# Patient Record
Sex: MALE | Race: BLACK OR AFRICAN AMERICAN | NOT HISPANIC OR LATINO
[De-identification: names, ages, dates, MRNs, and addresses within clinical notes are randomized per-mention and may not be internally consistent; named-entity substitution may affect disease eponyms.]

---

## 2017-05-01 ENCOUNTER — APPOINTMENT (OUTPATIENT)
Dept: OPHTHALMOLOGY | Facility: CLINIC | Age: 57
End: 2017-05-01

## 2017-08-28 ENCOUNTER — APPOINTMENT (OUTPATIENT)
Dept: OPHTHALMOLOGY | Facility: CLINIC | Age: 57
End: 2017-08-28
Payer: COMMERCIAL

## 2017-08-28 DIAGNOSIS — H52.4 MYOPIA, BILATERAL: ICD-10-CM

## 2017-08-28 DIAGNOSIS — H52.203 MYOPIA, BILATERAL: ICD-10-CM

## 2017-08-28 DIAGNOSIS — H52.13 MYOPIA, BILATERAL: ICD-10-CM

## 2017-08-28 PROCEDURE — 92331: CPT | Mod: NC

## 2018-06-26 ENCOUNTER — APPOINTMENT (OUTPATIENT)
Dept: INTERNAL MEDICINE | Facility: CLINIC | Age: 58
End: 2018-06-26
Payer: COMMERCIAL

## 2018-06-26 ENCOUNTER — APPOINTMENT (OUTPATIENT)
Dept: OTOLARYNGOLOGY | Facility: CLINIC | Age: 58
End: 2018-06-26
Payer: COMMERCIAL

## 2018-06-26 ENCOUNTER — NON-APPOINTMENT (OUTPATIENT)
Age: 58
End: 2018-06-26

## 2018-06-26 ENCOUNTER — LABORATORY RESULT (OUTPATIENT)
Age: 58
End: 2018-06-26

## 2018-06-26 VITALS — OXYGEN SATURATION: 96 % | SYSTOLIC BLOOD PRESSURE: 115 MMHG | HEART RATE: 81 BPM | DIASTOLIC BLOOD PRESSURE: 81 MMHG

## 2018-06-26 VITALS
OXYGEN SATURATION: 97 % | SYSTOLIC BLOOD PRESSURE: 132 MMHG | HEART RATE: 86 BPM | WEIGHT: 311 LBS | TEMPERATURE: 98.5 F | DIASTOLIC BLOOD PRESSURE: 88 MMHG | BODY MASS INDEX: 37.87 KG/M2 | HEIGHT: 76 IN

## 2018-06-26 DIAGNOSIS — R06.83 SNORING: ICD-10-CM

## 2018-06-26 DIAGNOSIS — H90.3 SENSORINEURAL HEARING LOSS, BILATERAL: ICD-10-CM

## 2018-06-26 DIAGNOSIS — Z82.49 FAMILY HISTORY OF ISCHEMIC HEART DISEASE AND OTHER DISEASES OF THE CIRCULATORY SYSTEM: ICD-10-CM

## 2018-06-26 DIAGNOSIS — R94.120 ABNORMAL AUDITORY FUNCTION STUDY: ICD-10-CM

## 2018-06-26 DIAGNOSIS — Z98.890 OTHER SPECIFIED POSTPROCEDURAL STATES: ICD-10-CM

## 2018-06-26 DIAGNOSIS — R20.0 ANESTHESIA OF SKIN: ICD-10-CM

## 2018-06-26 PROCEDURE — 99386 PREV VISIT NEW AGE 40-64: CPT | Mod: 25

## 2018-06-26 PROCEDURE — 36415 COLL VENOUS BLD VENIPUNCTURE: CPT

## 2018-06-26 PROCEDURE — 92551 PURE TONE HEARING TEST AIR: CPT

## 2018-06-26 PROCEDURE — 92550 TYMPANOMETRY & REFLEX THRESH: CPT

## 2018-06-26 PROCEDURE — 92557 COMPREHENSIVE HEARING TEST: CPT

## 2018-06-26 PROCEDURE — 99204 OFFICE O/P NEW MOD 45 MIN: CPT

## 2018-06-26 PROCEDURE — 93000 ELECTROCARDIOGRAM COMPLETE: CPT

## 2018-06-26 PROCEDURE — G0444 DEPRESSION SCREEN ANNUAL: CPT | Mod: 24

## 2018-06-26 NOTE — HISTORY OF PRESENT ILLNESS
[FreeTextEntry1] : Annual exam [de-identified] : Bilateral 2nd and 3rd digit cramping\par - started after wife was fooling around and twisted his fingers \par - wakes up with fingers cramped and distorted\par - uses stress ball to constantly move fingers and prevent cramping\par \par HTN \par - compliant w/ medications\par - BP slightly elevated today\par - previously on norvasc \par \par DM2\par - compliant w/ medication \par - no hypoglycemic sx\par - unsure of previous A1c\par \par Folliculitis\par - chronic issue, back of scalp 2/2 ingrown hairs\par \par obese\par - has gained weight over the year\par - tries to exercise, enjoys walking, however after 3 miles will develop ankle pain

## 2018-06-26 NOTE — PHYSICAL EXAM
[No Acute Distress] : no acute distress [Well Nourished] : well nourished [Well Developed] : well developed [Well-Appearing] : well-appearing [Normal Sclera/Conjunctiva] : normal sclera/conjunctiva [EOMI] : extraocular movements intact [Normal Outer Ear/Nose] : the outer ears and nose were normal in appearance [Supple] : supple [No Lymphadenopathy] : no lymphadenopathy [Thyroid Normal, No Nodules] : the thyroid was normal and there were no nodules present [No Respiratory Distress] : no respiratory distress  [Clear to Auscultation] : lungs were clear to auscultation bilaterally [No Accessory Muscle Use] : no accessory muscle use [Normal Rate] : normal rate  [Regular Rhythm] : with a regular rhythm [Normal S1, S2] : normal S1 and S2 [No Murmur] : no murmur heard [No Varicosities] : no varicosities [No Edema] : there was no peripheral edema [No Extremity Clubbing/Cyanosis] : no extremity clubbing/cyanosis [Soft] : abdomen soft [Non Tender] : non-tender [Non-distended] : non-distended [No Masses] : no abdominal mass palpated [No HSM] : no HSM [Normal Bowel Sounds] : normal bowel sounds [Normal Supraclavicular Nodes] : no supraclavicular lymphadenopathy [Normal Anterior Cervical Nodes] : no anterior cervical lymphadenopathy [No Joint Swelling] : no joint swelling [Grossly Normal Strength/Tone] : grossly normal strength/tone [No Rash] : no rash [Normal Gait] : normal gait [Coordination Grossly Intact] : coordination grossly intact [No Focal Deficits] : no focal deficits [Normal Affect] : the affect was normal [Alert and Oriented x3] : oriented to person, place, and time [Normal Mood] : the mood was normal [Normal Insight/Judgement] : insight and judgment were intact [de-identified] : pustules over occiput

## 2018-06-26 NOTE — HEALTH RISK ASSESSMENT
[Excellent] : ~his/her~  mood as  excellent [No falls in past year] : Patient reported no falls in the past year [0] : 2) Feeling down, depressed, or hopeless: Not at all (0) [Patient reported colonoscopy was normal] : Patient reported colonoscopy was normal [HIV Test offered] : HIV Test offered [Hepatitis C test offered] : Hepatitis C test offered [None] : None [With Significant Other] : lives with significant other [Employed] : employed [College] : College [] :  [Sexually Active] : sexually active [] : No [[id number]] : 0 [Change in mental status noted] : No change in mental status noted [Language] : denies difficulty with language [Behavior] : denies difficulty with behavior [Learning/Retaining New Information] : denies difficulty learning/retaining new information [Handling Complex Tasks] : denies difficulty handling complex tasks [Reasoning] : denies difficulty with reasoning [Spatial Ability and Orientation] : denies difficulty with spatial ability and orientation [High Risk Behavior] : no high risk behavior [Reports changes in hearing] : Reports no changes in hearing [Reports changes in vision] : Reports no changes in vision [Reports changes in dental health] : Reports no changes in dental health [ColonoscopyDate] : 2013

## 2018-06-29 LAB
25(OH)D3 SERPL-MCNC: 10 NG/ML
ALBUMIN SERPL ELPH-MCNC: 4.3 G/DL
ALP BLD-CCNC: 115 U/L
ALT SERPL-CCNC: 26 U/L
ANION GAP SERPL CALC-SCNC: 17 MMOL/L
APPEARANCE: CLEAR
AST SERPL-CCNC: 17 U/L
BASOPHILS # BLD AUTO: 0.05 K/UL
BASOPHILS NFR BLD AUTO: 0.5 %
BILIRUB SERPL-MCNC: 0.6 MG/DL
BILIRUBIN URINE: NEGATIVE
BLOOD URINE: NEGATIVE
BUN SERPL-MCNC: 15 MG/DL
C TRACH RRNA SPEC QL NAA+PROBE: NOT DETECTED
CALCIUM SERPL-MCNC: 9.6 MG/DL
CHLORIDE SERPL-SCNC: 107 MMOL/L
CHOLEST SERPL-MCNC: 165 MG/DL
CHOLEST/HDLC SERPL: 3.2 RATIO
CO2 SERPL-SCNC: 23 MMOL/L
COLOR: YELLOW
CREAT SERPL-MCNC: 1.21 MG/DL
CREAT SPEC-SCNC: 205 MG/DL
EOSINOPHIL # BLD AUTO: 0.17 K/UL
EOSINOPHIL NFR BLD AUTO: 1.7 %
GLUCOSE QUALITATIVE U: 1000 MG/DL
GLUCOSE SERPL-MCNC: 261 MG/DL
HBA1C MFR BLD HPLC: 8.9 %
HCT VFR BLD CALC: 41.1 %
HCV AB SER QL: NONREACTIVE
HCV S/CO RATIO: 0.26 S/CO
HDLC SERPL-MCNC: 51 MG/DL
HGB BLD-MCNC: 13.4 G/DL
HIV1+2 AB SPEC QL IA.RAPID: NONREACTIVE
IMM GRANULOCYTES NFR BLD AUTO: 0.1 %
KETONES URINE: NEGATIVE
LDLC SERPL CALC-MCNC: 80 MG/DL
LEUKOCYTE ESTERASE URINE: NEGATIVE
LYMPHOCYTES # BLD AUTO: 2.41 K/UL
LYMPHOCYTES NFR BLD AUTO: 23.8 %
MAN DIFF?: NORMAL
MCHC RBC-ENTMCNC: 30.7 PG
MCHC RBC-ENTMCNC: 32.6 GM/DL
MCV RBC AUTO: 94.1 FL
MICROALBUMIN 24H UR DL<=1MG/L-MCNC: 60 MG/DL
MICROALBUMIN/CREAT 24H UR-RTO: 293 MG/G
MONOCYTES # BLD AUTO: 0.27 K/UL
MONOCYTES NFR BLD AUTO: 2.7 %
N GONORRHOEA RRNA SPEC QL NAA+PROBE: NOT DETECTED
NEUTROPHILS # BLD AUTO: 7.21 K/UL
NEUTROPHILS NFR BLD AUTO: 71.2 %
NITRITE URINE: NEGATIVE
PH URINE: 5
PLATELET # BLD AUTO: 215 K/UL
POTASSIUM SERPL-SCNC: 4.9 MMOL/L
PROT SERPL-MCNC: 7.2 G/DL
PROTEIN URINE: 100 MG/DL
PSA SERPL-MCNC: 1.14 NG/ML
RBC # BLD: 4.37 M/UL
RBC # FLD: 12.5 %
SODIUM SERPL-SCNC: 147 MMOL/L
SOURCE AMPLIFICATION: NORMAL
SPECIFIC GRAVITY URINE: 1.03
T PALLIDUM AB SER QL IA: NEGATIVE
T4 FREE SERPL-MCNC: 1.2 NG/DL
TRIGL SERPL-MCNC: 171 MG/DL
TSH SERPL-ACNC: 1.14 UIU/ML
UROBILINOGEN URINE: NEGATIVE MG/DL
WBC # FLD AUTO: 10.12 K/UL

## 2018-10-26 ENCOUNTER — MEDICATION RENEWAL (OUTPATIENT)
Age: 58
End: 2018-10-26

## 2018-11-01 ENCOUNTER — MEDICATION RENEWAL (OUTPATIENT)
Age: 58
End: 2018-11-01

## 2018-11-26 ENCOUNTER — MEDICATION RENEWAL (OUTPATIENT)
Age: 58
End: 2018-11-26

## 2019-01-31 ENCOUNTER — MEDICATION RENEWAL (OUTPATIENT)
Age: 59
End: 2019-01-31

## 2019-02-27 ENCOUNTER — NON-APPOINTMENT (OUTPATIENT)
Age: 59
End: 2019-02-27

## 2019-02-27 ENCOUNTER — LABORATORY RESULT (OUTPATIENT)
Age: 59
End: 2019-02-27

## 2019-02-27 ENCOUNTER — APPOINTMENT (OUTPATIENT)
Dept: INTERNAL MEDICINE | Facility: CLINIC | Age: 59
End: 2019-02-27
Payer: COMMERCIAL

## 2019-02-27 VITALS
HEART RATE: 112 BPM | BODY MASS INDEX: 37.51 KG/M2 | HEIGHT: 76 IN | DIASTOLIC BLOOD PRESSURE: 91 MMHG | TEMPERATURE: 99.1 F | SYSTOLIC BLOOD PRESSURE: 150 MMHG | OXYGEN SATURATION: 98 % | WEIGHT: 308 LBS

## 2019-02-27 VITALS — DIASTOLIC BLOOD PRESSURE: 100 MMHG | SYSTOLIC BLOOD PRESSURE: 159 MMHG

## 2019-02-27 DIAGNOSIS — Z23 ENCOUNTER FOR IMMUNIZATION: ICD-10-CM

## 2019-02-27 DIAGNOSIS — L91.8 OTHER HYPERTROPHIC DISORDERS OF THE SKIN: ICD-10-CM

## 2019-02-27 PROCEDURE — 99214 OFFICE O/P EST MOD 30 MIN: CPT | Mod: 25

## 2019-02-27 PROCEDURE — 36415 COLL VENOUS BLD VENIPUNCTURE: CPT

## 2019-02-27 PROCEDURE — 90686 IIV4 VACC NO PRSV 0.5 ML IM: CPT

## 2019-02-27 PROCEDURE — 90670 PCV13 VACCINE IM: CPT

## 2019-02-27 PROCEDURE — 90472 IMMUNIZATION ADMIN EACH ADD: CPT

## 2019-02-27 PROCEDURE — 93000 ELECTROCARDIOGRAM COMPLETE: CPT

## 2019-02-27 PROCEDURE — 90471 IMMUNIZATION ADMIN: CPT

## 2019-02-27 RX ORDER — CLINDAMYCIN PHOSPHATE 10 MG/ML
1 LOTION TOPICAL TWICE DAILY
Qty: 1 | Refills: 3 | Status: ACTIVE | COMMUNITY
Start: 2018-06-26 | End: 1900-01-01

## 2019-02-27 NOTE — COUNSELING
[Healthy eating counseling provided] : healthy eating [Low Fat Diet] : Low fat diet [Decrease Portions] : Decrease food portions [Keep Food Diary] : Keep food diary [None] : None [Good understanding] : Patient has a good understanding of lifestyle changes and the steps needed to achieve self management goals

## 2019-02-27 NOTE — PHYSICAL EXAM
[No Acute Distress] : no acute distress [Well Nourished] : well nourished [Well Developed] : well developed [Well-Appearing] : well-appearing [Normal Sclera/Conjunctiva] : normal sclera/conjunctiva [EOMI] : extraocular movements intact [No Respiratory Distress] : no respiratory distress  [Clear to Auscultation] : lungs were clear to auscultation bilaterally [Normal S1, S2] : normal S1 and S2 [No Edema] : there was no peripheral edema [Normal Gait] : normal gait [Normal Affect] : the affect was normal [Alert and Oriented x3] : oriented to person, place, and time [Normal Mood] : the mood was normal [Normal Insight/Judgement] : insight and judgment were intact [de-identified] : tachycardic [de-identified] : scabbed over follicules back of scalp, numerous acrochordons around base of neck

## 2019-02-27 NOTE — HISTORY OF PRESENT ILLNESS
[FreeTextEntry1] : Dm2 f/u [de-identified] : DM2\par - uncontrolled\par - after last visit did not increased to 1000mg BID only daily\par - states BID made him feel dizzy, although the same thing happened when he initiated 500mg but then went away.\par \par Occipital folliculitis\par - improved w/ topical clinda solution\par \par VIt d\par - completed 1 month of tx\par - did not  refills\par \par HTN\par - BP elevated today\par - compliant w/ lisinopril 10mg\par - states is currently stressed w/ work and took the train in today which he normally does not do. \par - scheduled for stress echo w/ cardio\par \par HCM\par - needs pna and flu vaccines

## 2019-03-01 ENCOUNTER — MEDICATION RENEWAL (OUTPATIENT)
Age: 59
End: 2019-03-01

## 2019-03-01 LAB
25(OH)D3 SERPL-MCNC: 10.5 NG/ML
ALBUMIN SERPL ELPH-MCNC: 4.5 G/DL
ALP BLD-CCNC: 102 U/L
ALT SERPL-CCNC: 23 U/L
ANION GAP SERPL CALC-SCNC: 14 MMOL/L
APPEARANCE: CLEAR
AST SERPL-CCNC: 15 U/L
BASOPHILS # BLD AUTO: 0.06 K/UL
BASOPHILS NFR BLD AUTO: 0.6 %
BILIRUB SERPL-MCNC: 0.6 MG/DL
BILIRUBIN URINE: NEGATIVE
BLOOD URINE: NEGATIVE
BUN SERPL-MCNC: 20 MG/DL
CALCIUM SERPL-MCNC: 9.6 MG/DL
CHLORIDE SERPL-SCNC: 104 MMOL/L
CHOLEST SERPL-MCNC: 137 MG/DL
CHOLEST/HDLC SERPL: 3.5 RATIO
CO2 SERPL-SCNC: 23 MMOL/L
COLOR: YELLOW
CREAT SERPL-MCNC: 1.31 MG/DL
CREAT SPEC-SCNC: 238 MG/DL
EOSINOPHIL # BLD AUTO: 0.21 K/UL
EOSINOPHIL NFR BLD AUTO: 2.2 %
GLUCOSE QUALITATIVE U: ABNORMAL
GLUCOSE SERPL-MCNC: 268 MG/DL
HBA1C MFR BLD HPLC: 8.5 %
HCT VFR BLD CALC: 45 %
HDLC SERPL-MCNC: 39 MG/DL
HGB BLD-MCNC: 13.7 G/DL
IMM GRANULOCYTES NFR BLD AUTO: 0.3 %
KETONES URINE: NEGATIVE
LDLC SERPL CALC-MCNC: 71 MG/DL
LEUKOCYTE ESTERASE URINE: NEGATIVE
LYMPHOCYTES # BLD AUTO: 1.98 K/UL
LYMPHOCYTES NFR BLD AUTO: 20.8 %
MAN DIFF?: NORMAL
MCHC RBC-ENTMCNC: 30.4 GM/DL
MCHC RBC-ENTMCNC: 31.1 PG
MCV RBC AUTO: 102.3 FL
MICROALBUMIN 24H UR DL<=1MG/L-MCNC: 33.2 MG/DL
MICROALBUMIN/CREAT 24H UR-RTO: 139 MG/G
MONOCYTES # BLD AUTO: 0.44 K/UL
MONOCYTES NFR BLD AUTO: 4.6 %
NEUTROPHILS # BLD AUTO: 6.79 K/UL
NEUTROPHILS NFR BLD AUTO: 71.5 %
NITRITE URINE: NEGATIVE
PH URINE: 5.5
PLATELET # BLD AUTO: 213 K/UL
POTASSIUM SERPL-SCNC: 4.7 MMOL/L
PROT SERPL-MCNC: 7.1 G/DL
PROTEIN URINE: ABNORMAL
RBC # BLD: 4.4 M/UL
RBC # FLD: 11.5 %
SODIUM SERPL-SCNC: 141 MMOL/L
SPECIFIC GRAVITY URINE: 1.03
TRIGL SERPL-MCNC: 137 MG/DL
UROBILINOGEN URINE: NORMAL
WBC # FLD AUTO: 9.51 K/UL

## 2019-04-22 ENCOUNTER — APPOINTMENT (OUTPATIENT)
Dept: INTERNAL MEDICINE | Facility: CLINIC | Age: 59
End: 2019-04-22

## 2019-08-12 ENCOUNTER — LABORATORY RESULT (OUTPATIENT)
Age: 59
End: 2019-08-12

## 2019-08-12 ENCOUNTER — NON-APPOINTMENT (OUTPATIENT)
Age: 59
End: 2019-08-12

## 2019-08-12 ENCOUNTER — APPOINTMENT (OUTPATIENT)
Dept: INTERNAL MEDICINE | Facility: CLINIC | Age: 59
End: 2019-08-12
Payer: COMMERCIAL

## 2019-08-12 VITALS
HEART RATE: 90 BPM | TEMPERATURE: 98.7 F | SYSTOLIC BLOOD PRESSURE: 151 MMHG | HEIGHT: 76 IN | OXYGEN SATURATION: 98 % | WEIGHT: 314 LBS | BODY MASS INDEX: 38.24 KG/M2 | DIASTOLIC BLOOD PRESSURE: 105 MMHG

## 2019-08-12 DIAGNOSIS — Z87.2 PERSONAL HISTORY OF DISEASES OF THE SKIN AND SUBCUTANEOUS TISSUE: ICD-10-CM

## 2019-08-12 DIAGNOSIS — Z87.39 PERSONAL HISTORY OF OTHER DISEASES OF THE MUSCULOSKELETAL SYSTEM AND CONNECTIVE TISSUE: ICD-10-CM

## 2019-08-12 PROCEDURE — 36415 COLL VENOUS BLD VENIPUNCTURE: CPT

## 2019-08-12 PROCEDURE — 99396 PREV VISIT EST AGE 40-64: CPT | Mod: 25

## 2019-08-12 PROCEDURE — 93000 ELECTROCARDIOGRAM COMPLETE: CPT

## 2019-08-12 NOTE — HISTORY OF PRESENT ILLNESS
[FreeTextEntry1] : annual exam [de-identified] : DM2\par - takens [id number] daily instead of BID\par - tries to monitor diet\par - met w/ dietician learned which foods to avoid\par - will increase exercise\par \par Obesity\par - has gained weight since last visit\par \par HTN\par - uncontrolled\par - compliant w/ lisinopril and low salt diet\par - no CP/SOB\par - never went for stress test.\par \par HCM\par - needs pna23 next winter

## 2019-08-12 NOTE — HEALTH RISK ASSESSMENT
[Very Good] : ~his/her~  mood as very good [] : No [Yes] : Yes [No falls in past year] : Patient reported no falls in the past year [[id number]] : 0 [0] : 2) Feeling down, depressed, or hopeless: Not at all (0) [Change in mental status noted] : No change in mental status noted [Language] : denies difficulty with language [Behavior] : denies difficulty with behavior [Learning/Retaining New Information] : denies difficulty learning/retaining new information [Handling Complex Tasks] : denies difficulty handling complex tasks [Reasoning] : denies difficulty with reasoning [Spatial Ability and Orientation] : denies difficulty with spatial ability and orientation [None] : None [With Significant Other] : lives with significant other [Employed] : employed [] :  [Sexually Active] : sexually active [Fully functional (bathing, dressing, toileting, transferring, walking, feeding)] : Fully functional (bathing, dressing, toileting, transferring, walking, feeding) [Fully functional (using the telephone, shopping, preparing meals, housekeeping, doing laundry, using] : Fully functional and needs no help or supervision to perform IADLs (using the telephone, shopping, preparing meals, housekeeping, doing laundry, using transportation, managing medications and managing finances) [With Patient/Caregiver] : With Patient/Caregiver [AdvancecareDate] : 08/12/2019

## 2019-08-12 NOTE — PHYSICAL EXAM
[No Acute Distress] : no acute distress [Well Nourished] : well nourished [Well Developed] : well developed [Well-Appearing] : well-appearing [Normal Sclera/Conjunctiva] : normal sclera/conjunctiva [EOMI] : extraocular movements intact [Normal Outer Ear/Nose] : the outer ears and nose were normal in appearance [No Lymphadenopathy] : no lymphadenopathy [Supple] : supple [Thyroid Normal, No Nodules] : the thyroid was normal and there were no nodules present [No Respiratory Distress] : no respiratory distress  [No Accessory Muscle Use] : no accessory muscle use [Clear to Auscultation] : lungs were clear to auscultation bilaterally [Normal Rate] : normal rate  [Normal S1, S2] : normal S1 and S2 [Regular Rhythm] : with a regular rhythm [No Murmur] : no murmur heard [No Varicosities] : no varicosities [No Edema] : there was no peripheral edema [No Palpable Aorta] : no palpable aorta [Soft] : abdomen soft [Non Tender] : non-tender [Non-distended] : non-distended [No Masses] : no abdominal mass palpated [No HSM] : no HSM [Normal Supraclavicular Nodes] : no supraclavicular lymphadenopathy [Normal Anterior Cervical Nodes] : no anterior cervical lymphadenopathy [No Joint Swelling] : no joint swelling [Grossly Normal Strength/Tone] : grossly normal strength/tone [No Rash] : no rash [No Focal Deficits] : no focal deficits [Coordination Grossly Intact] : coordination grossly intact [Normal Gait] : normal gait [Normal Affect] : the affect was normal [Alert and Oriented x3] : oriented to person, place, and time [Normal Insight/Judgement] : insight and judgment were intact [Normal Mood] : the mood was normal

## 2019-08-14 LAB
25(OH)D3 SERPL-MCNC: 26.6 NG/ML
ALBUMIN SERPL ELPH-MCNC: 4.4 G/DL
ALP BLD-CCNC: 102 U/L
ALT SERPL-CCNC: 16 U/L
ANION GAP SERPL CALC-SCNC: 12 MMOL/L
APPEARANCE: CLEAR
AST SERPL-CCNC: 12 U/L
BASOPHILS # BLD AUTO: 0.1 K/UL
BASOPHILS NFR BLD AUTO: 1.2 %
BILIRUB SERPL-MCNC: 0.5 MG/DL
BILIRUBIN URINE: NEGATIVE
BLOOD URINE: NEGATIVE
BUN SERPL-MCNC: 17 MG/DL
CALCIUM SERPL-MCNC: 9.7 MG/DL
CHLORIDE SERPL-SCNC: 109 MMOL/L
CHOLEST SERPL-MCNC: 143 MG/DL
CHOLEST/HDLC SERPL: 3 RATIO
CO2 SERPL-SCNC: 24 MMOL/L
COLOR: YELLOW
CREAT SERPL-MCNC: 1.55 MG/DL
CREAT SPEC-SCNC: 206 MG/DL
EOSINOPHIL # BLD AUTO: 0.34 K/UL
EOSINOPHIL NFR BLD AUTO: 4.2 %
ESTIMATED AVERAGE GLUCOSE: 160 MG/DL
GLUCOSE QUALITATIVE U: NEGATIVE
GLUCOSE SERPL-MCNC: 190 MG/DL
HBA1C MFR BLD HPLC: 7.2 %
HCT VFR BLD CALC: 39.1 %
HCV AB SER QL: NONREACTIVE
HCV S/CO RATIO: 0.17 S/CO
HDLC SERPL-MCNC: 47 MG/DL
HGB BLD-MCNC: 12 G/DL
IMM GRANULOCYTES NFR BLD AUTO: 0.2 %
KETONES URINE: NEGATIVE
LDLC SERPL CALC-MCNC: 79 MG/DL
LEUKOCYTE ESTERASE URINE: NEGATIVE
LYMPHOCYTES # BLD AUTO: 1.92 K/UL
LYMPHOCYTES NFR BLD AUTO: 23.5 %
MAN DIFF?: NORMAL
MCHC RBC-ENTMCNC: 30.5 PG
MCHC RBC-ENTMCNC: 30.7 GM/DL
MCV RBC AUTO: 99.5 FL
MICROALBUMIN 24H UR DL<=1MG/L-MCNC: 18 MG/DL
MICROALBUMIN/CREAT 24H UR-RTO: 87 MG/G
MONOCYTES # BLD AUTO: 0.47 K/UL
MONOCYTES NFR BLD AUTO: 5.7 %
NEUTROPHILS # BLD AUTO: 5.33 K/UL
NEUTROPHILS NFR BLD AUTO: 65.2 %
NITRITE URINE: NEGATIVE
PH URINE: 5.5
PLATELET # BLD AUTO: 216 K/UL
POTASSIUM SERPL-SCNC: 4.7 MMOL/L
PROT SERPL-MCNC: 6.9 G/DL
PROTEIN URINE: ABNORMAL
PSA SERPL-MCNC: 1.38 NG/ML
RBC # BLD: 3.93 M/UL
RBC # FLD: 11.9 %
SODIUM SERPL-SCNC: 145 MMOL/L
SPECIFIC GRAVITY URINE: 1.02
T4 FREE SERPL-MCNC: 1.1 NG/DL
TRIGL SERPL-MCNC: 87 MG/DL
TSH SERPL-ACNC: 1.34 UIU/ML
UROBILINOGEN URINE: NORMAL
VIT B12 SERPL-MCNC: 490 PG/ML
WBC # FLD AUTO: 8.18 K/UL

## 2019-08-28 ENCOUNTER — APPOINTMENT (OUTPATIENT)
Dept: HEART AND VASCULAR | Facility: CLINIC | Age: 59
End: 2019-08-28
Payer: COMMERCIAL

## 2019-08-28 ENCOUNTER — NON-APPOINTMENT (OUTPATIENT)
Age: 59
End: 2019-08-28

## 2019-08-28 VITALS
HEIGHT: 76 IN | OXYGEN SATURATION: 99 % | DIASTOLIC BLOOD PRESSURE: 81 MMHG | BODY MASS INDEX: 37.99 KG/M2 | HEART RATE: 100 BPM | WEIGHT: 312 LBS | SYSTOLIC BLOOD PRESSURE: 122 MMHG

## 2019-08-28 PROCEDURE — 99204 OFFICE O/P NEW MOD 45 MIN: CPT | Mod: 25

## 2019-08-28 PROCEDURE — 93015 CV STRESS TEST SUPVJ I&R: CPT

## 2019-08-28 PROCEDURE — 93000 ELECTROCARDIOGRAM COMPLETE: CPT | Mod: 59

## 2019-08-28 NOTE — HISTORY OF PRESENT ILLNESS
[FreeTextEntry1] : The patient walks 3 miles a day. He is no family history of coronary artery disease. He has a history of hypertension and diabetes. He has not had any chest discomfort.

## 2019-08-28 NOTE — DISCUSSION/SUMMARY
[FreeTextEntry1] : The patient has multiple risk factors for coronary artery disease including diabetes. He has anterior ST elevation on his EKG which is probably a normal variant. The stress EKG was normal. Significant coronary artery disease is unlikely. Diet and exercise were discussed. His cholesterol and blood pressure well controlled. The patient will continue his current medication.

## 2019-08-28 NOTE — PHYSICAL EXAM
[General Appearance - Well Developed] : well developed [Normal Appearance] : normal appearance [Well Groomed] : well groomed [General Appearance - Well Nourished] : well nourished [No Deformities] : no deformities [Normal Conjunctiva] : the conjunctiva exhibited no abnormalities [General Appearance - In No Acute Distress] : no acute distress [Eyelids - No Xanthelasma] : the eyelids demonstrated no xanthelasmas [Normal Oral Mucosa] : normal oral mucosa [No Oral Pallor] : no oral pallor [No Oral Cyanosis] : no oral cyanosis [Normal Jugular Venous A Waves Present] : normal jugular venous A waves present [Normal Jugular Venous V Waves Present] : normal jugular venous V waves present [No Jugular Venous Spencer A Waves] : no jugular venous spencer A waves [Exaggerated Use Of Accessory Muscles For Inspiration] : no accessory muscle use [Respiration, Rhythm And Depth] : normal respiratory rhythm and effort [Auscultation Breath Sounds / Voice Sounds] : lungs were clear to auscultation bilaterally [Heart Rate And Rhythm] : heart rate and rhythm were normal [Heart Sounds] : normal S1 and S2 [Murmurs] : no murmurs present [FreeTextEntry1] : dp 2/2, pt 0/0 [Abdomen Soft] : soft [Abdomen Tenderness] : non-tender [Abdomen Mass (___ Cm)] : no abdominal mass palpated [Abnormal Walk] : normal gait [Gait - Sufficient For Exercise Testing] : the gait was sufficient for exercise testing [Nail Clubbing] : no clubbing of the fingernails [Cyanosis, Localized] : no localized cyanosis [Petechial Hemorrhages (___cm)] : no petechial hemorrhages [Skin Color & Pigmentation] : normal skin color and pigmentation [] : no rash [No Venous Stasis] : no venous stasis [Skin Lesions] : no skin lesions [No Skin Ulcers] : no skin ulcer [No Xanthoma] : no  xanthoma was observed [Oriented To Time, Place, And Person] : oriented to person, place, and time [Affect] : the affect was normal [Mood] : the mood was normal [No Anxiety] : not feeling anxious

## 2019-09-20 ENCOUNTER — APPOINTMENT (OUTPATIENT)
Dept: INTERNAL MEDICINE | Facility: CLINIC | Age: 59
End: 2019-09-20

## 2019-10-01 ENCOUNTER — APPOINTMENT (OUTPATIENT)
Dept: INTERNAL MEDICINE | Facility: CLINIC | Age: 59
End: 2019-10-01
Payer: COMMERCIAL

## 2019-10-01 VITALS
BODY MASS INDEX: 37.99 KG/M2 | TEMPERATURE: 98.7 F | DIASTOLIC BLOOD PRESSURE: 80 MMHG | WEIGHT: 312 LBS | HEART RATE: 92 BPM | SYSTOLIC BLOOD PRESSURE: 128 MMHG | OXYGEN SATURATION: 99 % | HEIGHT: 76 IN

## 2019-10-01 PROCEDURE — 99214 OFFICE O/P EST MOD 30 MIN: CPT | Mod: 25

## 2019-10-01 PROCEDURE — 36415 COLL VENOUS BLD VENIPUNCTURE: CPT

## 2019-10-01 NOTE — HISTORY OF PRESENT ILLNESS
[FreeTextEntry1] : follow up [de-identified] : elevated Cr\par - 1.55, increased 2/2019\par - unsure if from metformin 1000mg or from lisinopril/hctz\par - will need levels repeated \par - has never seen nephrology\par - feels well otherwise.

## 2019-10-01 NOTE — PHYSICAL EXAM
[No Acute Distress] : no acute distress [Well Nourished] : well nourished [Well Developed] : well developed [Well-Appearing] : well-appearing [Normal Sclera/Conjunctiva] : normal sclera/conjunctiva [EOMI] : extraocular movements intact [Normal Outer Ear/Nose] : the outer ears and nose were normal in appearance [No Respiratory Distress] : no respiratory distress  [Clear to Auscultation] : lungs were clear to auscultation bilaterally [No Varicosities] : no varicosities [No Palpable Aorta] : no palpable aorta [No Edema] : there was no peripheral edema [No Rash] : no rash [Coordination Grossly Intact] : coordination grossly intact [Normal Gait] : normal gait [No Focal Deficits] : no focal deficits [Normal Affect] : the affect was normal [Alert and Oriented x3] : oriented to person, place, and time [Normal Mood] : the mood was normal [Normal Insight/Judgement] : insight and judgment were intact

## 2019-10-04 LAB
ALBUMIN MFR SERPL ELPH: 60.3 %
ALBUMIN SERPL ELPH-MCNC: 4.5 G/DL
ALBUMIN SERPL-MCNC: 4.3 G/DL
ALBUMIN/GLOB SERPL: 1.5 RATIO
ALP BLD-CCNC: 95 U/L
ALPHA1 GLOB MFR SERPL ELPH: 3.5 %
ALPHA1 GLOB SERPL ELPH-MCNC: 0.2 G/DL
ALPHA2 GLOB MFR SERPL ELPH: 8.8 %
ALPHA2 GLOB SERPL ELPH-MCNC: 0.6 G/DL
ALT SERPL-CCNC: 24 U/L
ANION GAP SERPL CALC-SCNC: 13 MMOL/L
AST SERPL-CCNC: 15 U/L
B-GLOBULIN MFR SERPL ELPH: 14.6 %
B-GLOBULIN SERPL ELPH-MCNC: 1 G/DL
BILIRUB SERPL-MCNC: 0.4 MG/DL
BUN SERPL-MCNC: 26 MG/DL
CALCIUM SERPL-MCNC: 9.8 MG/DL
CHLORIDE SERPL-SCNC: 107 MMOL/L
CO2 SERPL-SCNC: 22 MMOL/L
CREAT SERPL-MCNC: 1.57 MG/DL
GAMMA GLOB FLD ELPH-MCNC: 0.9 G/DL
GAMMA GLOB MFR SERPL ELPH: 12.8 %
GLUCOSE SERPL-MCNC: 154 MG/DL
INTERPRETATION SERPL IEP-IMP: NORMAL
LDH SERPL-CCNC: 151 U/L
MAGNESIUM SERPL-MCNC: 2 MG/DL
POTASSIUM SERPL-SCNC: 4.8 MMOL/L
PROT SERPL-MCNC: 7.1 G/DL
SODIUM SERPL-SCNC: 142 MMOL/L
URATE SERPL-MCNC: 8.7 MG/DL

## 2019-10-04 RX ORDER — LISINOPRIL AND HYDROCHLOROTHIAZIDE TABLETS 20; 12.5 MG/1; MG/1
20-12.5 TABLET ORAL DAILY
Qty: 90 | Refills: 3 | Status: DISCONTINUED | COMMUNITY
Start: 2019-08-12 | End: 2019-10-04

## 2019-10-30 ENCOUNTER — APPOINTMENT (OUTPATIENT)
Dept: NEPHROLOGY | Facility: CLINIC | Age: 59
End: 2019-10-30
Payer: COMMERCIAL

## 2019-10-30 ENCOUNTER — LABORATORY RESULT (OUTPATIENT)
Age: 59
End: 2019-10-30

## 2019-10-30 VITALS — DIASTOLIC BLOOD PRESSURE: 67 MMHG | SYSTOLIC BLOOD PRESSURE: 109 MMHG | HEART RATE: 88 BPM

## 2019-10-30 VITALS — SYSTOLIC BLOOD PRESSURE: 115 MMHG | DIASTOLIC BLOOD PRESSURE: 79 MMHG

## 2019-10-30 VITALS — SYSTOLIC BLOOD PRESSURE: 111 MMHG | DIASTOLIC BLOOD PRESSURE: 88 MMHG

## 2019-10-30 PROCEDURE — 36415 COLL VENOUS BLD VENIPUNCTURE: CPT

## 2019-10-30 PROCEDURE — 99204 OFFICE O/P NEW MOD 45 MIN: CPT | Mod: 25

## 2019-10-30 NOTE — ASSESSMENT
[FreeTextEntry1] : # CKD stage 3 c/w DM nephropathy.\par * Recheck labs, including monoclonal protein evaluation.\par * Check renal ultrasound.\par * The patient has been counseled that chronic kidney disease is a significant condition and regular office followup with me (at least every 1 months for now) is essential for monitoring, and that it is their responsibility to make a follow up appointment.\par * The patient has been counseled never to stop taking their medications without discussing it with me or another doctor.\par * The patient has been counseled on risk of acute renal failure and instructed to immediately call and speak with me or go immediately to ER with any severe symptoms, nausea, vomiting, diarrhea, chest pain, or shortness of breath.\par * The patient has been counseled on avoiding NSAIDs.\par * Reducing or avoiding red meat, following a relatively low oxalate diet, and starting folate 800 mg qd has been advised.\par * A counseling information sheet has been given and they were provided sufficient time to review this. All their questions were answered.\par \par # HTN controlled. (Measured with largest cuff.)\par * He was advised that he must always use the largest cuff to measure his blood pressure. (He will obtain a large cuff for home.) \par * The patient's blood pressure was checked with the [employer] [id number] using the SPRINT trial protocol after sitting quietly in an empty room with arm supported, back supported, and feet on the floor for 5 minutes. The average of 3 readings were taken. \par * The patient has been advised to check their BP at home with an automatic arm cuff, write down the readings, and reach me directly on the phone immediately if they are persistently > 180 systolic or if SBP is less than 100 or if lightheadedness develops. They were advised to bring in all blood pressure readings and medications next visit.\par * The patient has been counseled that they have a a significant medical condition and regular office followup (at least every 1 months for now) is essential for monitoring, and that it is their responsibility to make follow up appointments.\par * The patient also has been counseled that they must never stop or change any medications without discussing this with me (or another physician). \par * A counseling information sheet has been given and they were provided sufficient time to review this sheet. All their questions were answered.\par \par # Proteinuria.\par * Recheck urine protein.\par * His estimated urine albumin is 254 mg/d based on a calculated 24 hour urine creatinine of 2910 with proteinuria of 447 mg/d.\par \par # Type 2 DM.\par * Hold metformin if SCr > 2.\par * Benefits, alternatives, and risks to invokana including but not limited to amputations, severe infections, acid in the blood, dehydration, and acute kidney injury discussed and they consent. They will go immediately to the ER with any unusual symptoms, severe symptoms, or symptoms of acid in the blood (like nausea, vomiting, malaise, abdominal pain, etc.). I counseled that they should care for their feet and evaluate their feet for any signs of infection daily and inform us immediately with any evidence of infection. Discussed that patients in the CREDENCE trial continued the medication even if their kidney function decreased and that we are not using this medication for diabetic control, but rather to help protect the kidneys and heart. UpToDate patient information form on medication provided. All their questions were answered.\par * Depending on proteinuria, will consider changing lisinopril HCTZ to lisinopril and starting invokana.

## 2019-10-30 NOTE — PHYSICAL EXAM
[General Appearance - Alert] : alert [General Appearance - In No Acute Distress] : in no acute distress [Sclera] : the sclera and conjunctiva were normal [PERRL With Normal Accommodation] : pupils were equal in size, round, and reactive to light [Extraocular Movements] : extraocular movements were intact [Outer Ear] : the ears and nose were normal in appearance [Oropharynx] : the oropharynx was normal [Neck Appearance] : the appearance of the neck was normal [Neck Cervical Mass (___cm)] : no neck mass was observed [Jugular Venous Distention Increased] : there was no jugular-venous distention [Thyroid Diffuse Enlargement] : the thyroid was not enlarged [Thyroid Nodule] : there were no palpable thyroid nodules [Auscultation Breath Sounds / Voice Sounds] : lungs were clear to auscultation bilaterally [Heart Rate And Rhythm] : heart rate was normal and rhythm regular [Heart Sounds] : normal S1 and S2 [Heart Sounds Gallop] : no gallops [Murmurs] : no murmurs [Heart Sounds Pericardial Friction Rub] : no pericardial rub [Edema] : there was no peripheral edema [Veins - Varicosity Changes] : there were no varicosital changes [Bowel Sounds] : normal bowel sounds [Abdomen Soft] : soft [Abdomen Tenderness] : non-tender [Abdomen Mass (___ Cm)] : no abdominal mass palpated [Cervical Lymph Nodes Enlarged Posterior Bilaterally] : posterior cervical [Cervical Lymph Nodes Enlarged Anterior Bilaterally] : anterior cervical [Supraclavicular Lymph Nodes Enlarged Bilaterally] : supraclavicular [Abnormal Walk] : normal gait [Nail Clubbing] : no clubbing  or cyanosis of the fingernails [Musculoskeletal - Swelling] : no joint swelling seen [Motor Tone] : muscle strength and tone were normal [Skin Color & Pigmentation] : normal skin color and pigmentation [Skin Turgor] : normal skin turgor [] : no rash [FreeTextEntry1] : no foot lesions [Oriented To Time, Place, And Person] : oriented to person, place, and time [Impaired Insight] : insight and judgment were intact [Affect] : the affect was normal

## 2019-11-03 ENCOUNTER — FORM ENCOUNTER (OUTPATIENT)
Age: 59
End: 2019-11-03

## 2019-11-04 ENCOUNTER — APPOINTMENT (OUTPATIENT)
Dept: ULTRASOUND IMAGING | Facility: HOSPITAL | Age: 59
End: 2019-11-04
Payer: COMMERCIAL

## 2019-11-04 ENCOUNTER — OUTPATIENT (OUTPATIENT)
Dept: OUTPATIENT SERVICES | Facility: HOSPITAL | Age: 59
LOS: 1 days | End: 2019-11-04
Payer: COMMERCIAL

## 2019-11-04 PROCEDURE — 76770 US EXAM ABDO BACK WALL COMP: CPT

## 2019-11-04 PROCEDURE — 76770 US EXAM ABDO BACK WALL COMP: CPT | Mod: 26

## 2019-11-06 LAB
ALBUMIN MFR SERPL ELPH: 58.6 %
ALBUMIN SERPL ELPH-MCNC: 4.6 G/DL
ALBUMIN SERPL-MCNC: 4.3 G/DL
ALBUMIN/GLOB SERPL: 1.4 RATIO
ALP BLD-CCNC: 106 U/L
ALPHA1 GLOB MFR SERPL ELPH: 3.9 %
ALPHA1 GLOB SERPL ELPH-MCNC: 0.3 G/DL
ALPHA2 GLOB MFR SERPL ELPH: 9.3 %
ALPHA2 GLOB SERPL ELPH-MCNC: 0.7 G/DL
ALT SERPL-CCNC: 19 U/L
ANION GAP SERPL CALC-SCNC: 17 MMOL/L
APPEARANCE: CLEAR
AST SERPL-CCNC: 14 U/L
B-GLOBULIN MFR SERPL ELPH: 15.1 %
B-GLOBULIN SERPL ELPH-MCNC: 1.1 G/DL
BASOPHILS # BLD AUTO: 0.11 K/UL
BASOPHILS NFR BLD AUTO: 1 %
BILIRUB SERPL-MCNC: 0.4 MG/DL
BILIRUBIN URINE: NEGATIVE
BLOOD URINE: NEGATIVE
BUN SERPL-MCNC: 22 MG/DL
CALCIUM SERPL-MCNC: 9.9 MG/DL
CHLORIDE SERPL-SCNC: 108 MMOL/L
CO2 SERPL-SCNC: 21 MMOL/L
COLOR: NORMAL
CREAT SERPL-MCNC: 1.54 MG/DL
CREAT SPEC-SCNC: 164 MG/DL
CREAT SPEC-SCNC: 164 MG/DL
CREAT/PROT UR: 0.2 RATIO
CYSTATIN C SERPL-MCNC: 1.15 MG/L
DEPRECATED KAPPA LC FREE/LAMBDA SER: 1.99 RATIO
EOSINOPHIL # BLD AUTO: 0.25 K/UL
EOSINOPHIL NFR BLD AUTO: 2.2 %
GAMMA GLOB FLD ELPH-MCNC: 1 G/DL
GAMMA GLOB MFR SERPL ELPH: 13.1 %
GFR/BSA.PRED SERPLBLD CYS-BASED-ARV: 65 ML/MIN
GLUCOSE QUALITATIVE U: NEGATIVE
GLUCOSE SERPL-MCNC: 179 MG/DL
HCT VFR BLD CALC: 41.6 %
HGB BLD-MCNC: 12.7 G/DL
IGA SER QL IEP: 491 MG/DL
IGG SER QL IEP: 1054 MG/DL
IGM SER QL IEP: 30 MG/DL
IMM GRANULOCYTES NFR BLD AUTO: 0.2 %
INTERPRETATION SERPL IEP-IMP: NORMAL
KAPPA LC CSF-MCNC: 1.54 MG/DL
KAPPA LC SERPL-MCNC: 3.07 MG/DL
KETONES URINE: NEGATIVE
LEUKOCYTE ESTERASE URINE: NEGATIVE
LYMPHOCYTES # BLD AUTO: 2.45 K/UL
LYMPHOCYTES NFR BLD AUTO: 21.9 %
M PROTEIN SPEC IFE-MCNC: NORMAL
MAGNESIUM SERPL-MCNC: 2.1 MG/DL
MAN DIFF?: NORMAL
MCHC RBC-ENTMCNC: 30.5 GM/DL
MCHC RBC-ENTMCNC: 30.8 PG
MCV RBC AUTO: 101 FL
MICROALBUMIN 24H UR DL<=1MG/L-MCNC: 8.1 MG/DL
MICROALBUMIN/CREAT 24H UR-RTO: 49 MG/G
MONOCYTES # BLD AUTO: 0.54 K/UL
MONOCYTES NFR BLD AUTO: 4.8 %
NEUTROPHILS # BLD AUTO: 7.8 K/UL
NEUTROPHILS NFR BLD AUTO: 69.9 %
NITRITE URINE: NEGATIVE
PH URINE: 5.5
PLATELET # BLD AUTO: 231 K/UL
POTASSIUM SERPL-SCNC: 5 MMOL/L
PROT SERPL-MCNC: 7.3 G/DL
PROT UR-MCNC: 27 MG/DL
PROTEIN URINE: NORMAL
RBC # BLD: 4.12 M/UL
RBC # FLD: 11.7 %
SODIUM SERPL-SCNC: 146 MMOL/L
SPECIFIC GRAVITY URINE: 1.02
URATE SERPL-MCNC: 7.8 MG/DL
UROBILINOGEN URINE: NORMAL
VIT B12 SERPL-MCNC: 638 PG/ML
WBC # FLD AUTO: 11.17 K/UL

## 2019-11-25 ENCOUNTER — MEDICATION RENEWAL (OUTPATIENT)
Age: 59
End: 2019-11-25

## 2019-12-02 ENCOUNTER — APPOINTMENT (OUTPATIENT)
Dept: NEPHROLOGY | Facility: CLINIC | Age: 59
End: 2019-12-02
Payer: COMMERCIAL

## 2019-12-02 VITALS — DIASTOLIC BLOOD PRESSURE: 85 MMHG | SYSTOLIC BLOOD PRESSURE: 125 MMHG

## 2019-12-02 VITALS — WEIGHT: 315 LBS | HEIGHT: 76 IN | BODY MASS INDEX: 38.36 KG/M2

## 2019-12-02 VITALS — DIASTOLIC BLOOD PRESSURE: 74 MMHG | SYSTOLIC BLOOD PRESSURE: 116 MMHG | HEART RATE: 100 BPM

## 2019-12-02 PROCEDURE — 99214 OFFICE O/P EST MOD 30 MIN: CPT

## 2019-12-02 NOTE — HISTORY OF PRESENT ILLNESS
[FreeTextEntry1] : Kindly referred by Dr. Syed for CKD.\par \par * HTN controlled. No lightheadedness. Compliant with medications. * CKD stable, creatinine 1.54. eGFR 65 by cystatin C. \par \par Previous history (30Oct19): * The previous records were reviewed and summarized. Cr has slowly  increased from 1.21 in June 2018 to 1.57 in Oct 2019. 87 - 293 mg albuminuria. No hematuria. SPEP, HIV normal. In August 2019, lisinopril 10 was changed to lisinopril 20/12.5 mg. The patient denies exposure to chronic NSAIDs, chronic PPIs, green smoothies, creatine, or herbal supplements. The patient denies a history of kidney stones or UTIs.  No significant nocturia. No recent renal ultrasound. They are unaware of proteinuria or hematuria. \par \par * DM uncontrolled, HGA1c 7.2. Disgnosed 5 years ago. No neuropathy. No retinopathy. \par \par * He checks his BP at home but doesn't feel his cuff is big enough. He doesn't rest.  - 150s.

## 2019-12-02 NOTE — PHYSICAL EXAM
[General Appearance - Alert] : alert [General Appearance - In No Acute Distress] : in no acute distress [Sclera] : the sclera and conjunctiva were normal [PERRL With Normal Accommodation] : pupils were equal in size, round, and reactive to light [Outer Ear] : the ears and nose were normal in appearance [Extraocular Movements] : extraocular movements were intact [Oropharynx] : the oropharynx was normal [Neck Cervical Mass (___cm)] : no neck mass was observed [Neck Appearance] : the appearance of the neck was normal [Thyroid Diffuse Enlargement] : the thyroid was not enlarged [Thyroid Nodule] : there were no palpable thyroid nodules [Jugular Venous Distention Increased] : there was no jugular-venous distention [Heart Rate And Rhythm] : heart rate was normal and rhythm regular [Auscultation Breath Sounds / Voice Sounds] : lungs were clear to auscultation bilaterally [Heart Sounds] : normal S1 and S2 [Heart Sounds Gallop] : no gallops [Murmurs] : no murmurs [Heart Sounds Pericardial Friction Rub] : no pericardial rub [Edema] : there was no peripheral edema [Veins - Varicosity Changes] : there were no varicosital changes [Bowel Sounds] : normal bowel sounds [Abdomen Soft] : soft [Abdomen Tenderness] : non-tender [Abdomen Mass (___ Cm)] : no abdominal mass palpated [Cervical Lymph Nodes Enlarged Anterior Bilaterally] : anterior cervical [Cervical Lymph Nodes Enlarged Posterior Bilaterally] : posterior cervical [Abnormal Walk] : normal gait [Supraclavicular Lymph Nodes Enlarged Bilaterally] : supraclavicular [Motor Tone] : muscle strength and tone were normal [Musculoskeletal - Swelling] : no joint swelling seen [Nail Clubbing] : no clubbing  or cyanosis of the fingernails [Skin Color & Pigmentation] : normal skin color and pigmentation [Skin Turgor] : normal skin turgor [] : no rash [Oriented To Time, Place, And Person] : oriented to person, place, and time [FreeTextEntry1] : no foot lesions [Impaired Insight] : insight and judgment were intact [Affect] : the affect was normal

## 2019-12-02 NOTE — ASSESSMENT
[FreeTextEntry1] : # CKD stage 3 c/w DM nephropathy.\par * The patient has been counseled that chronic kidney disease is a significant condition and regular office followup with me (at least every 3 - 4 months for now) is essential for monitoring, and that it is their responsibility to make a follow up appointment.\par * A counseling information sheet had been given and they were provided sufficient time to review this sheet. All their questions were answered.\par * The patient has been counseled never to stop taking their medications without discussing it with me or another doctor.\par * The patient has been counseled on risk of acute renal failure and instructed to immediately call and speak with me or go immediately to ER with any severe symptoms, nausea, vomiting, diarrhea, chest pain, or shortness of breath.\par * The patient has been counseled on avoiding NSAIDs.\par * Reducing or avoiding red meat, following a relatively low oxalate diet, and starting folate 800 mg qd has been advised.\par \par # HTN controlled.\par * Stay off lisinopril/HCTZ given low BP and no proteinuria.\par * The patient's blood pressure was checked with the [employer] [id number]-[id number] using the SPRINT trial protocol after sitting quietly in an empty room with arm supported, back supported, and feet on the floor for 5 minutes. The average of 3 readings were taken. \par * A counseling information sheet had been given and they were provided sufficient time to review this sheet. All their questions were answered.\par * The patient has been counseled to check their BP at home with an automatic arm cuff, write down the readings, and reach me directly on the phone immediately if they are persistently > 180 systolic or if SBP is less than 100 or if lightheadedness develops. They were counseled to bring in all blood pressure readings and medications next visit.\par * The patient has been counseled that they have a a significant medical condition and regular office followup (at least every 4 months for now) is essential for monitoring, and that it is their responsibility to make follow up appointments.\par * The patient also has been counseled that they must never stop or change any medications without discussing this with me (or another physician). \par \par # Proteinuria.\par * Now improved.\par \par # Type 2 DM.\par * Given no proteinuria, no clear benefit of SGLT2 inhibitor. \par

## 2019-12-27 ENCOUNTER — MEDICATION RENEWAL (OUTPATIENT)
Age: 59
End: 2019-12-27

## 2020-01-06 ENCOUNTER — TRANSCRIPTION ENCOUNTER (OUTPATIENT)
Age: 60
End: 2020-01-06

## 2020-01-08 ENCOUNTER — MEDICATION RENEWAL (OUTPATIENT)
Age: 60
End: 2020-01-08

## 2020-03-11 ENCOUNTER — RX RENEWAL (OUTPATIENT)
Age: 60
End: 2020-03-11

## 2020-04-20 ENCOUNTER — APPOINTMENT (OUTPATIENT)
Dept: INTERNAL MEDICINE | Facility: CLINIC | Age: 60
End: 2020-04-20
Payer: COMMERCIAL

## 2020-04-20 DIAGNOSIS — U07.1 COVID-19: ICD-10-CM

## 2020-04-20 PROCEDURE — 99213 OFFICE O/P EST LOW 20 MIN: CPT | Mod: 95

## 2020-04-20 NOTE — HISTORY OF PRESENT ILLNESS
[FreeTextEntry1] : chest pain [de-identified] : chest pain\par - intermittent\par - feels like someone punched him in the chest\par - worse at night and when lying on his right side\par - no dyspnea, no SOB\par - not exertional\par - improves when standing or lying on back\par - has normal Stress test 1 year ago

## 2020-05-06 ENCOUNTER — APPOINTMENT (OUTPATIENT)
Dept: INTERNAL MEDICINE | Facility: CLINIC | Age: 60
End: 2020-05-06
Payer: COMMERCIAL

## 2020-05-06 ENCOUNTER — LABORATORY RESULT (OUTPATIENT)
Age: 60
End: 2020-05-06

## 2020-05-06 VITALS
OXYGEN SATURATION: 97 % | DIASTOLIC BLOOD PRESSURE: 89 MMHG | HEIGHT: 76 IN | BODY MASS INDEX: 36.9 KG/M2 | TEMPERATURE: 98.4 F | HEART RATE: 115 BPM | SYSTOLIC BLOOD PRESSURE: 130 MMHG | WEIGHT: 303 LBS

## 2020-05-06 DIAGNOSIS — R07.89 OTHER CHEST PAIN: ICD-10-CM

## 2020-05-06 PROCEDURE — 36415 COLL VENOUS BLD VENIPUNCTURE: CPT

## 2020-05-06 PROCEDURE — 99213 OFFICE O/P EST LOW 20 MIN: CPT | Mod: 25

## 2020-05-06 RX ORDER — OMEPRAZOLE 40 MG/1
40 CAPSULE, DELAYED RELEASE ORAL
Qty: 30 | Refills: 3 | Status: COMPLETED | COMMUNITY
Start: 2020-04-20 | End: 2020-05-06

## 2020-05-06 NOTE — HISTORY OF PRESENT ILLNESS
[FreeTextEntry1] : right chest pain [de-identified] : Right chest pain\par - present for the past 3 months\par - no help w/ PPI\par - believes it started after he injured himself exercising w/  that was too aggressive.\par - pain is worse w/ movement, dull constant ache\par

## 2020-05-07 LAB
ALBUMIN SERPL ELPH-MCNC: 4.2 G/DL
ALP BLD-CCNC: 120 U/L
ALT SERPL-CCNC: 21 U/L
ANION GAP SERPL CALC-SCNC: 17 MMOL/L
APPEARANCE: ABNORMAL
AST SERPL-CCNC: 16 U/L
BASOPHILS # BLD AUTO: 0.13 K/UL
BASOPHILS NFR BLD AUTO: 1.1 %
BILIRUB SERPL-MCNC: 0.5 MG/DL
BILIRUBIN URINE: NEGATIVE
BLOOD URINE: NEGATIVE
BUN SERPL-MCNC: 26 MG/DL
CALCIUM SERPL-MCNC: 9.9 MG/DL
CHLORIDE SERPL-SCNC: 100 MMOL/L
CHOLEST SERPL-MCNC: 213 MG/DL
CHOLEST/HDLC SERPL: 4.6 RATIO
CO2 SERPL-SCNC: 21 MMOL/L
COLOR: YELLOW
CREAT SERPL-MCNC: 1.89 MG/DL
CREAT SPEC-SCNC: 270 MG/DL
EOSINOPHIL # BLD AUTO: 0.16 K/UL
EOSINOPHIL NFR BLD AUTO: 1.4 %
ESTIMATED AVERAGE GLUCOSE: 197 MG/DL
GLUCOSE QUALITATIVE U: ABNORMAL
GLUCOSE SERPL-MCNC: 299 MG/DL
HBA1C MFR BLD HPLC: 8.5 %
HCT VFR BLD CALC: 41 %
HDLC SERPL-MCNC: 46 MG/DL
HGB BLD-MCNC: 12.7 G/DL
IMM GRANULOCYTES NFR BLD AUTO: 0.3 %
KETONES URINE: NEGATIVE
LDLC SERPL CALC-MCNC: 109 MG/DL
LEUKOCYTE ESTERASE URINE: NEGATIVE
LYMPHOCYTES # BLD AUTO: 2.67 K/UL
LYMPHOCYTES NFR BLD AUTO: 22.9 %
MAN DIFF?: NORMAL
MCHC RBC-ENTMCNC: 30.5 PG
MCHC RBC-ENTMCNC: 31 GM/DL
MCV RBC AUTO: 98.3 FL
MICROALBUMIN 24H UR DL<=1MG/L-MCNC: 335 MG/DL
MICROALBUMIN/CREAT 24H UR-RTO: 1239 MG/G
MONOCYTES # BLD AUTO: 0.53 K/UL
MONOCYTES NFR BLD AUTO: 4.5 %
NEUTROPHILS # BLD AUTO: 8.15 K/UL
NEUTROPHILS NFR BLD AUTO: 69.8 %
NITRITE URINE: NEGATIVE
PH URINE: 6
PLATELET # BLD AUTO: 274 K/UL
POTASSIUM SERPL-SCNC: 4.9 MMOL/L
PROT SERPL-MCNC: 7.4 G/DL
PROTEIN URINE: ABNORMAL
RBC # BLD: 4.17 M/UL
RBC # FLD: 12.3 %
SODIUM SERPL-SCNC: 139 MMOL/L
SPECIFIC GRAVITY URINE: 1.02
TRIGL SERPL-MCNC: 289 MG/DL
UROBILINOGEN URINE: NORMAL
WBC # FLD AUTO: 11.68 K/UL

## 2020-05-12 LAB
SARS-COV-2 IGG SERPL IA-ACNC: 7.1 INDEX
SARS-COV-2 IGG SERPL QL IA: POSITIVE

## 2020-10-16 ENCOUNTER — APPOINTMENT (OUTPATIENT)
Dept: OPHTHALMOLOGY | Facility: CLINIC | Age: 60
End: 2020-10-16

## 2020-11-03 ENCOUNTER — APPOINTMENT (OUTPATIENT)
Dept: NEPHROLOGY | Facility: CLINIC | Age: 60
End: 2020-11-03

## 2021-03-26 ENCOUNTER — APPOINTMENT (OUTPATIENT)
Dept: INTERNAL MEDICINE | Facility: CLINIC | Age: 61
End: 2021-03-26
Payer: COMMERCIAL

## 2021-03-26 ENCOUNTER — APPOINTMENT (OUTPATIENT)
Dept: OPHTHALMOLOGY | Facility: CLINIC | Age: 61
End: 2021-03-26
Payer: COMMERCIAL

## 2021-03-26 ENCOUNTER — NON-APPOINTMENT (OUTPATIENT)
Age: 61
End: 2021-03-26

## 2021-03-26 ENCOUNTER — LABORATORY RESULT (OUTPATIENT)
Age: 61
End: 2021-03-26

## 2021-03-26 VITALS
TEMPERATURE: 98.4 F | SYSTOLIC BLOOD PRESSURE: 143 MMHG | BODY MASS INDEX: 36.77 KG/M2 | DIASTOLIC BLOOD PRESSURE: 86 MMHG | WEIGHT: 302 LBS | HEART RATE: 92 BPM | HEIGHT: 76 IN | OXYGEN SATURATION: 98 %

## 2021-03-26 DIAGNOSIS — R80.9 TYPE 2 DIABETES MELLITUS WITH OTHER DIABETIC KIDNEY COMPLICATION: ICD-10-CM

## 2021-03-26 DIAGNOSIS — E11.29 TYPE 2 DIABETES MELLITUS WITH OTHER DIABETIC KIDNEY COMPLICATION: ICD-10-CM

## 2021-03-26 DIAGNOSIS — Z00.00 ENCOUNTER FOR GENERAL ADULT MEDICAL EXAMINATION W/OUT ABNORMAL FINDINGS: ICD-10-CM

## 2021-03-26 DIAGNOSIS — E55.9 VITAMIN D DEFICIENCY, UNSPECIFIED: ICD-10-CM

## 2021-03-26 PROCEDURE — 92004 COMPRE OPH EXAM NEW PT 1/>: CPT

## 2021-03-26 PROCEDURE — 99072 ADDL SUPL MATRL&STAF TM PHE: CPT

## 2021-03-26 PROCEDURE — 93000 ELECTROCARDIOGRAM COMPLETE: CPT

## 2021-03-26 PROCEDURE — 36415 COLL VENOUS BLD VENIPUNCTURE: CPT

## 2021-03-26 PROCEDURE — 92134 CPTRZ OPH DX IMG PST SGM RTA: CPT

## 2021-03-26 PROCEDURE — 99396 PREV VISIT EST AGE 40-64: CPT | Mod: 25

## 2021-03-26 NOTE — PHYSICAL EXAM
[No Acute Distress] : no acute distress [Well Nourished] : well nourished [Well Developed] : well developed [Well-Appearing] : well-appearing [Normal Sclera/Conjunctiva] : normal sclera/conjunctiva [EOMI] : extraocular movements intact [Normal Outer Ear/Nose] : the outer ears and nose were normal in appearance [No Respiratory Distress] : no respiratory distress  [No Accessory Muscle Use] : no accessory muscle use [Clear to Auscultation] : lungs were clear to auscultation bilaterally [Normal Rate] : normal rate  [Regular Rhythm] : with a regular rhythm [Normal S1, S2] : normal S1 and S2 [No Murmur] : no murmur heard [No Edema] : there was no peripheral edema [No Extremity Clubbing/Cyanosis] : no extremity clubbing/cyanosis [Soft] : abdomen soft [Non Tender] : non-tender [Non-distended] : non-distended [No Masses] : no abdominal mass palpated [No HSM] : no HSM [No Joint Swelling] : no joint swelling [Grossly Normal Strength/Tone] : grossly normal strength/tone [No Rash] : no rash [Coordination Grossly Intact] : coordination grossly intact [No Focal Deficits] : no focal deficits [Normal Gait] : normal gait [Normal Affect] : the affect was normal [Alert and Oriented x3] : oriented to person, place, and time [Normal Mood] : the mood was normal [Normal Insight/Judgement] : insight and judgment were intact

## 2021-03-26 NOTE — HISTORY OF PRESENT ILLNESS
[FreeTextEntry1] : annual exam [de-identified] : left shoulder pain\par - started a few weeks ago\par - "woke up" w/ pain and decreased ROM\par - pain is burning pain over deltoid worse w/ rotating arm in and out\par \par Dm2\par - has increased exercise\par - takes glimeperide daily\par - takes metformin daily instead of BID\par \par HTN/HLD\par - compliant w/ medications\par - no CP/SOB\par \par HCM\par - colonoscopy due\par - had flu vax and 1st pfizer vax.

## 2021-03-30 LAB
25(OH)D3 SERPL-MCNC: 37.5 NG/ML
ALBUMIN SERPL ELPH-MCNC: 4.1 G/DL
ALP BLD-CCNC: 108 U/L
ALT SERPL-CCNC: 25 U/L
ANION GAP SERPL CALC-SCNC: 11 MMOL/L
APPEARANCE: CLEAR
AST SERPL-CCNC: 13 U/L
BASOPHILS # BLD AUTO: 0.09 K/UL
BASOPHILS NFR BLD AUTO: 0.9 %
BILIRUB SERPL-MCNC: 0.6 MG/DL
BILIRUBIN URINE: NEGATIVE
BLOOD URINE: NEGATIVE
BUN SERPL-MCNC: 23 MG/DL
CALCIUM SERPL-MCNC: 9.3 MG/DL
CHLORIDE SERPL-SCNC: 104 MMOL/L
CHOLEST SERPL-MCNC: 146 MG/DL
CO2 SERPL-SCNC: 24 MMOL/L
COLOR: YELLOW
CREAT SERPL-MCNC: 1.73 MG/DL
CREAT SPEC-SCNC: 164 MG/DL
EOSINOPHIL # BLD AUTO: 0.2 K/UL
EOSINOPHIL NFR BLD AUTO: 2 %
ESTIMATED AVERAGE GLUCOSE: 298 MG/DL
GLUCOSE QUALITATIVE U: ABNORMAL
GLUCOSE SERPL-MCNC: 327 MG/DL
HBA1C MFR BLD HPLC: 12 %
HCT VFR BLD CALC: 38.9 %
HDLC SERPL-MCNC: 41 MG/DL
HGB BLD-MCNC: 12.2 G/DL
IMM GRANULOCYTES NFR BLD AUTO: 0.3 %
KETONES URINE: NEGATIVE
LDLC SERPL CALC-MCNC: 67 MG/DL
LEUKOCYTE ESTERASE URINE: NEGATIVE
LYMPHOCYTES # BLD AUTO: 2.39 K/UL
LYMPHOCYTES NFR BLD AUTO: 24 %
MAN DIFF?: NORMAL
MCHC RBC-ENTMCNC: 31.2 PG
MCHC RBC-ENTMCNC: 31.4 GM/DL
MCV RBC AUTO: 99.5 FL
MICROALBUMIN 24H UR DL<=1MG/L-MCNC: 26.4 MG/DL
MICROALBUMIN/CREAT 24H UR-RTO: 161 MG/G
MONOCYTES # BLD AUTO: 0.4 K/UL
MONOCYTES NFR BLD AUTO: 4 %
NEUTROPHILS # BLD AUTO: 6.84 K/UL
NEUTROPHILS NFR BLD AUTO: 68.8 %
NITRITE URINE: NEGATIVE
NONHDLC SERPL-MCNC: 105 MG/DL
PH URINE: 5.5
PLATELET # BLD AUTO: 231 K/UL
POTASSIUM SERPL-SCNC: 5.3 MMOL/L
PROT SERPL-MCNC: 6.8 G/DL
PROTEIN URINE: ABNORMAL
PSA SERPL-MCNC: 1.65 NG/ML
RBC # BLD: 3.91 M/UL
RBC # FLD: 11.8 %
SODIUM SERPL-SCNC: 140 MMOL/L
SPECIFIC GRAVITY URINE: 1.03
T4 FREE SERPL-MCNC: 1.1 NG/DL
TRIGL SERPL-MCNC: 191 MG/DL
TSH SERPL-ACNC: 1.52 UIU/ML
UROBILINOGEN URINE: NORMAL
WBC # FLD AUTO: 9.95 K/UL

## 2021-04-07 ENCOUNTER — APPOINTMENT (OUTPATIENT)
Dept: ORTHOPEDIC SURGERY | Facility: CLINIC | Age: 61
End: 2021-04-07
Payer: COMMERCIAL

## 2021-04-07 VITALS
SYSTOLIC BLOOD PRESSURE: 146 MMHG | HEART RATE: 94 BPM | WEIGHT: 302 LBS | OXYGEN SATURATION: 97 % | HEIGHT: 76 IN | DIASTOLIC BLOOD PRESSURE: 83 MMHG | BODY MASS INDEX: 36.77 KG/M2

## 2021-04-07 DIAGNOSIS — M25.512 PAIN IN LEFT SHOULDER: ICD-10-CM

## 2021-04-07 PROCEDURE — 99203 OFFICE O/P NEW LOW 30 MIN: CPT

## 2021-04-07 PROCEDURE — 73030 X-RAY EXAM OF SHOULDER: CPT | Mod: LT

## 2021-04-07 PROCEDURE — 99072 ADDL SUPL MATRL&STAF TM PHE: CPT

## 2021-04-07 NOTE — HISTORY OF PRESENT ILLNESS
[de-identified] : Mr. 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  He feels burning pain down his lateral upper arm.  No prior shoulder injuries or problems\par Reaching back behind him hurts.  Dressing can be painful.  It feels better with rest.\par Has not done physical therapy or any acupuncture, injections or chiropractic treatment.  He just saw his internist.\par It hurts to sleep on the left.\par He hasn't taken any medication for it.

## 2021-04-07 NOTE — ASSESSMENT
[FreeTextEntry1] : 59 yo left shoulder pain for 1 month with x-ray showing a calcification consistent with calcific tendinitis which often is more painful than typical rotator cuff tendinopathy.  He has good strength of the rotator cuff with some limited motion and pain with motion.\par I recommended that he apply heat and ice and take an anti-inflammatory if he can.  I sent a note to his internist to make sure it is okay given his renal insufficiency.  He is seeing a nephrologist at the end of this month for further evaluation on that.  Otherwise we could try a steroid injection but that could cause elevation of his glucose with his diabetes.\par He was referred to physical therapy and given home exercises.\par Follow-up in 1 month to make sure it is improving.

## 2021-04-07 NOTE — PHYSICAL EXAM
[UE] : Sensory: Intact in bilateral upper extremities [Rad] : radial 2+ and symmetric bilaterally [Normal RUE] : Right Upper Extremity: No scars, rashes, lesions, ulcers, skin intact [Normal LUE] : Left Upper Extremity: No scars, rashes, lesions, ulcers, skin intact [Normal Touch] : sensation intact for touch [Normal] : Oriented to person, place, and time, insight and judgement were intact and the affect was normal [Obese] : obese [Pain Left Shoulder Active Forw Flexion Against Resistance] : negative Empty Can test [Shoulder Pain During Lofton-Francisco Impingement Test Left] : negative Lofton test [Shoulder Pain Elicited During Neer Impingement Test Left] : negative Neer test [Shoulder Motion On Adduction] : negative AC provocation [Shoulder Instability] : negative Anterior-Posterior Slide [Shoulder Instab Anterior Apprehension (Crank) Test Left] : negative Crank test [Shoulder Muscle Weakness Supraspinatus Left Only] : negative Drop Arm test [Shoulder Pain Elicited During [place]'s Test Left] : negative Mcgowan's test [Active Abduction Left Shoulder Decreased] : negative Painful Arc [Tender On Palpation Bicipital Groove Speed's Test Left] : negative Speed's test [Shoulder Instability Anterior Relocation Test Left] : negative Relocation test [de-identified] : No respiratory distress or cough [FreeTextEntry2] : Shoulders\par Elevation actively to 170 degrees with mild pain on the left.  Internal rotation to L4 on the left versus T10 on the right with pain on the left.\par External rotation to about 50 degrees on the left versus 60 degrees on the right.\par Positive Neer and Lofton left shoulder [de-identified] : \par X-rays of the left shoulder AP, bilateral and axillary views taken today show small calcific density in the supraspinatus tendon just off the greater tuberosity.  Spurring and narrowing of the AC joint consistent with arthritis.

## 2021-04-08 ENCOUNTER — NON-APPOINTMENT (OUTPATIENT)
Age: 61
End: 2021-04-08

## 2021-04-09 ENCOUNTER — APPOINTMENT (OUTPATIENT)
Dept: DISASTER EMERGENCY | Facility: OTHER | Age: 61
End: 2021-04-09
Payer: COMMERCIAL

## 2021-04-09 PROCEDURE — 0002A: CPT

## 2021-04-22 ENCOUNTER — APPOINTMENT (OUTPATIENT)
Dept: NEPHROLOGY | Facility: CLINIC | Age: 61
End: 2021-04-22
Payer: COMMERCIAL

## 2021-04-22 VITALS — SYSTOLIC BLOOD PRESSURE: 108 MMHG | DIASTOLIC BLOOD PRESSURE: 61 MMHG | HEART RATE: 94 BPM

## 2021-04-22 VITALS — TEMPERATURE: 98.6 F

## 2021-04-22 DIAGNOSIS — N18.1 TYPE 2 DIABETES MELLITUS WITH DIABETIC CHRONIC KIDNEY DISEASE: ICD-10-CM

## 2021-04-22 DIAGNOSIS — E11.22 TYPE 2 DIABETES MELLITUS WITH DIABETIC CHRONIC KIDNEY DISEASE: ICD-10-CM

## 2021-04-22 DIAGNOSIS — I12.9 TYPE 2 DIABETES MELLITUS WITH DIABETIC CHRONIC KIDNEY DISEASE: ICD-10-CM

## 2021-04-22 PROCEDURE — 99214 OFFICE O/P EST MOD 30 MIN: CPT

## 2021-04-22 PROCEDURE — 99072 ADDL SUPL MATRL&STAF TM PHE: CPT

## 2021-04-22 RX ORDER — DICLOFENAC SODIUM 50 MG/1
50 TABLET, DELAYED RELEASE ORAL TWICE DAILY
Qty: 30 | Refills: 0 | Status: DISCONTINUED | COMMUNITY
Start: 2021-04-07 | End: 2021-04-22

## 2021-04-22 NOTE — HISTORY OF PRESENT ILLNESS
[FreeTextEntry1] : Kindly referred by Dr. Syed for CKD.\par \par * Creatinine increased to 1.73 last month (3GFR 49). 161 mg albuminuria. Dm uncontrolled, HGA1c 12. * HTN uncontrolled, SBP 140s with Dr. Syed. \par \par Previous history (02Dec19): * HTN controlled. No lightheadedness. Compliant with medications. * CKD stable, creatinine 1.54. eGFR 65 by cystatin C. \par \par Previous history (30Oct19): * The previous records were reviewed and summarized. Cr has slowly  increased from 1.21 in June 2018 to 1.57 in Oct 2019. 87 - 293 mg albuminuria. No hematuria. SPEP, HIV normal. In August 2019, lisinopril 10 was changed to lisinopril 20/12.5 mg. The patient denies exposure to chronic NSAIDs, chronic PPIs, green smoothies, creatine, or herbal supplements. The patient denies a history of kidney stones or UTIs.  No significant nocturia. No recent renal ultrasound. They are unaware of proteinuria or hematuria. \par \par * DM uncontrolled, HGA1c 7.2. Disgnosed 5 years ago. No neuropathy. No retinopathy. \par \par * He checks his BP at home but doesn't feel his cuff is big enough. He doesn't rest.  - 150s.

## 2021-04-22 NOTE — ASSESSMENT
[FreeTextEntry1] : # HTN uncontrolled at home. \par * Reduce amlodipine to 5 mg daily. Start valsartan 40 mg daily. Benefits, alternatives, and risks to medication including but not limited to low blood pressure, electrolyte problems discussed and they consent. UpToDate patient information on medication electronically provided. All their questions were answered. \par * The patient's blood pressure was checked with the [employer] [id number]-907XL using the SPRINT trial protocol after sitting quietly in an empty room with arm supported, back supported, and feet on the floor for 5 minutes. The average of 3 readings were taken.\par * A counseling information sheet has been given (currently or previously, in-person or electronically). All their questions were answered.\par * The patient has been counseled to check their BP at home with an automatic arm cuff, write down the readings, and reach me directly on the phone immediately if they are persistently > 180 systolic or if SBP is less than 100 or if lightheadedness develops. They were counseled to bring in all blood pressure readings and medications next visit.\par * The patient has been counseled that regular office followup (at least every 1 month for now)  is important for monitoring and for their health, and that it is their responsibility to make follow up appointments.\par * The patient also has been counseled that they must never stop or change any medications without discussing this with me (or another physician). \par \par # CKD stage 3 with albuminuria c/w DM nephropathy.\par * He may benefit from SGLT2i. Will discuss.\par * Therapies for kidney disease: blood pressure control; proteinuria reduction with ARB/ACEi; other evidence-based therapies including exercise, a plant-based low-oxalate diet, and 400 mcg folic acid daily\par * Cardiovascular disease prevention: counseling on healthy diet, physical activity, weight loss, alcohol limitation, blood pressure control; moderate intensity statin therapy\par * The patient has been counseled that chronic kidney disease is a significant condition and regular office followup with me (at least every 1 months for now) is important for monitoring and their health, and that it is their responsibility to make a follow up appointment.\par * The patient has been counseled never to stop taking their medications without discussing it with me or another doctor.\par * The patient has been counseled on avoiding NSAIDs.\par * The patient has been counseled on risk of acute renal failure and instructed to immediately call and speak with me or go immediately to ER with any severe symptoms, nausea, vomiting, diarrhea, chest pain, or shortness of breath.\par * A counseling information sheet has been given (today or previously). All their questions were answered.\par \par # Obesity.\par * Weight loss. \par \par

## 2021-04-22 NOTE — PHYSICAL EXAM
[General Appearance - Alert] : alert [General Appearance - In No Acute Distress] : in no acute distress [Neck Appearance] : the appearance of the neck was normal [Neck Cervical Mass (___cm)] : no neck mass was observed [Jugular Venous Distention Increased] : there was no jugular-venous distention [Thyroid Diffuse Enlargement] : the thyroid was not enlarged [Thyroid Nodule] : there were no palpable thyroid nodules [Auscultation Breath Sounds / Voice Sounds] : lungs were clear to auscultation bilaterally [Heart Rate And Rhythm] : heart rate was normal and rhythm regular [Heart Sounds] : normal S1 and S2 [Heart Sounds Gallop] : no gallops [Murmurs] : no murmurs [Heart Sounds Pericardial Friction Rub] : no pericardial rub [Edema] : there was no peripheral edema [Bowel Sounds] : normal bowel sounds [Abdomen Soft] : soft [Abdomen Tenderness] : non-tender [Abdomen Mass (___ Cm)] : no abdominal mass palpated [] : no hepato-splenomegaly [Cervical Lymph Nodes Enlarged Posterior Bilaterally] : posterior cervical [Cervical Lymph Nodes Enlarged Anterior Bilaterally] : anterior cervical [Supraclavicular Lymph Nodes Enlarged Bilaterally] : supraclavicular [FreeTextEntry1] : Obese.

## 2021-04-29 RX ORDER — ERGOCALCIFEROL 1.25 MG/1
1.25 MG CAPSULE, LIQUID FILLED ORAL
Qty: 12 | Refills: 3 | Status: ACTIVE | COMMUNITY
Start: 2018-06-29 | End: 1900-01-01

## 2021-05-04 PROBLEM — M75.32 CALCIFIC TENDINITIS OF LEFT SHOULDER: Status: ACTIVE | Noted: 2021-04-07

## 2021-05-05 ENCOUNTER — APPOINTMENT (OUTPATIENT)
Dept: ORTHOPEDIC SURGERY | Facility: CLINIC | Age: 61
End: 2021-05-05
Payer: COMMERCIAL

## 2021-05-05 DIAGNOSIS — M75.32 CALCIFIC TENDINITIS OF LEFT SHOULDER: ICD-10-CM

## 2021-05-05 DIAGNOSIS — M19.012 PRIMARY OSTEOARTHRITIS, LEFT SHOULDER: ICD-10-CM

## 2021-05-05 DIAGNOSIS — M75.02 ADHESIVE CAPSULITIS OF LEFT SHOULDER: ICD-10-CM

## 2021-05-05 PROCEDURE — 99214 OFFICE O/P EST MOD 30 MIN: CPT

## 2021-05-05 PROCEDURE — 99072 ADDL SUPL MATRL&STAF TM PHE: CPT

## 2021-05-05 NOTE — ASSESSMENT
[FreeTextEntry1] : 59 yo left shoulder pain with x-ray showing a calcification consistent with calcific tendinitis which often is more painful than typical rotator cuff tendinopathy.  He has good strength of the rotator cuff with some limited motion and pain with motion.\par I r\par He was referred to physical therapy and given home exercises.\par Follow-up in 1 month to make sure it is improving.

## 2021-05-05 NOTE — HISTORY OF PRESENT ILLNESS
[de-identified] : Mr. Walsh presents for f/u left shoulder pain that started about 4 month ago without any injury or inciting event.\par Shoulder pain and motion are worsening.  He has been doing home exercise program.  He took NSAIDs.  I would like for him to get an MRI and I will call him with results.  Want to make sure there is no full-thickness rotator cuff tear which is something we may consider surgical treatment to repair.  If not and if it is more of an adhesive capsulitis and inflammation then we may try cortisone injection to alleviate his pain.  If we did a steroid injection and then he requires a repair of the rotator cuff there are studies that show that healing rates are less good so I would  prefer to avoid steroids in that situation.\par I will call him with the MRI results and further recommendations.\par For the Elbow he will work on range of motion and stretching

## 2021-05-05 NOTE — PHYSICAL EXAM
[UE] : Sensory: Intact in bilateral upper extremities [Rad] : radial 2+ and symmetric bilaterally [Normal RUE] : Right Upper Extremity: No scars, rashes, lesions, ulcers, skin intact [Normal LUE] : Left Upper Extremity: No scars, rashes, lesions, ulcers, skin intact [Normal Touch] : sensation intact for touch [Obese] : obese [Normal] : Oriented to person, place, and time, insight and judgement were intact and the affect was normal [Pain Left Shoulder Active Forw Flexion Against Resistance] : negative Empty Can test [Shoulder Pain During Lofton-Francisco Impingement Test Left] : negative Lofton test [Shoulder Pain Elicited During Neer Impingement Test Left] : negative Neer test [Shoulder Motion On Adduction] : negative AC provocation [Shoulder Instability] : negative Anterior-Posterior Slide [Shoulder Instab Anterior Apprehension (Crank) Test Left] : negative Crank test [Shoulder Muscle Weakness Supraspinatus Left Only] : negative Drop Arm test [Shoulder Pain Elicited During [place]'s Test Left] : negative Mcgowan's test [Active Abduction Left Shoulder Decreased] : negative Painful Arc [Tender On Palpation Bicipital Groove Speed's Test Left] : negative Speed's test [Shoulder Instability Anterior Relocation Test Left] : negative Relocation test [FreeTextEntry2] : Shoulders\par Elevation actively to 170 degrees with mild pain on the left.  Internal rotation to L4 on the left versus T10 on the right with pain on the left.\par External rotation to about 50 degrees on the left versus 60 degrees on the right.\par Positive Neer and Lofton left shoulder [de-identified] : No respiratory distress or cough [de-identified] : \par X-rays of the left shoulder AP, bilateral and axillary views taken 4/22/21 showed small calcific density in the supraspinatus tendon just off the greater tuberosity.  Spurring and narrowing of the AC joint consistent with arthritis.

## 2021-05-11 ENCOUNTER — APPOINTMENT (OUTPATIENT)
Age: 61
End: 2021-05-11
Payer: COMMERCIAL

## 2021-05-11 ENCOUNTER — NON-APPOINTMENT (OUTPATIENT)
Age: 61
End: 2021-05-11

## 2021-05-11 VITALS
SYSTOLIC BLOOD PRESSURE: 130 MMHG | HEART RATE: 97 BPM | OXYGEN SATURATION: 98 % | BODY MASS INDEX: 37.45 KG/M2 | RESPIRATION RATE: 15 BRPM | HEIGHT: 76 IN | WEIGHT: 307.5 LBS | DIASTOLIC BLOOD PRESSURE: 83 MMHG | TEMPERATURE: 97.2 F

## 2021-05-11 DIAGNOSIS — N18.30 CHRONIC KIDNEY DISEASE, STAGE 3 UNSPECIFIED: ICD-10-CM

## 2021-05-11 DIAGNOSIS — E66.9 OBESITY, UNSPECIFIED: ICD-10-CM

## 2021-05-11 DIAGNOSIS — I10 ESSENTIAL (PRIMARY) HYPERTENSION: ICD-10-CM

## 2021-05-11 DIAGNOSIS — E78.5 HYPERLIPIDEMIA, UNSPECIFIED: ICD-10-CM

## 2021-05-11 DIAGNOSIS — Z12.11 ENCOUNTER FOR SCREENING FOR MALIGNANT NEOPLASM OF COLON: ICD-10-CM

## 2021-05-11 DIAGNOSIS — E11.51 TYPE 2 DIABETES MELLITUS WITH DIABETIC PERIPHERAL ANGIOPATHY W/OUT GANGRENE: ICD-10-CM

## 2021-05-11 PROCEDURE — 99203 OFFICE O/P NEW LOW 30 MIN: CPT

## 2021-05-11 PROCEDURE — 99072 ADDL SUPL MATRL&STAF TM PHE: CPT

## 2021-05-12 PROBLEM — N18.30 CKD (CHRONIC KIDNEY DISEASE) STAGE 3, GFR 30-59 ML/MIN: Status: ACTIVE | Noted: 2019-08-14

## 2021-05-12 PROBLEM — E11.51 DIABETES MELLITUS WITH PERIPHERAL CIRCULATORY DISORDER: Status: ACTIVE | Noted: 2019-08-28

## 2021-05-12 PROBLEM — E66.9 OBESE: Status: ACTIVE | Noted: 2018-06-26

## 2021-05-12 PROBLEM — Z12.11 COLON CANCER SCREENING: Status: ACTIVE | Noted: 2021-05-12

## 2021-05-12 NOTE — PHYSICAL EXAM
[General Appearance - Alert] : alert [General Appearance - In No Acute Distress] : in no acute distress [General Appearance - Well Nourished] : well nourished [General Appearance - Well Developed] : well developed [Sclera] : the sclera and conjunctiva were normal [Outer Ear] : the ears and nose were normal in appearance [Neck Appearance] : the appearance of the neck was normal [] : no respiratory distress [Respiration, Rhythm And Depth] : normal respiratory rhythm and effort [Bowel Sounds] : normal bowel sounds [Abdomen Soft] : soft [Abnormal Walk] : normal gait [Musculoskeletal - Swelling] : no joint swelling seen [Skin Color & Pigmentation] : normal skin color and pigmentation [Skin Turgor] : normal skin turgor [Oriented To Time, Place, And Person] : oriented to person, place, and time [Impaired Insight] : insight and judgment were intact [FreeTextEntry1] : obese

## 2021-05-12 NOTE — ASSESSMENT
[FreeTextEntry1] : Plan for screening COLO [facility]\par Would like to review last report from 2013 if possible

## 2021-05-12 NOTE — HISTORY OF PRESENT ILLNESS
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

## 2021-05-27 ENCOUNTER — APPOINTMENT (OUTPATIENT)
Dept: ENDOCRINOLOGY | Facility: CLINIC | Age: 61
End: 2021-05-27

## 2021-06-01 NOTE — HISTORY OF PRESENT ILLNESS
[de-identified] : Mr. Walsh presents for f/u left shoulder pain that started about 5-6 month ago without any injury or inciting event.\par Shoulder pain and motion are worsening.

## 2021-06-01 NOTE — PHYSICAL EXAM
[UE] : Sensory: Intact in bilateral upper extremities [Rad] : radial 2+ and symmetric bilaterally [Shoulder Motion On Adduction] : negative AC provocation [Shoulder Instability] : negative Anterior-Posterior Slide [Shoulder Instab Anterior Apprehension (Crank) Test Left] : negative Crank test [Shoulder Muscle Weakness Supraspinatus Left Only] : negative Drop Arm test [Pain Left Shoulder Active Forw Flexion Against Resistance] : positive Empty Can test [Shoulder Pain During Lofton-Francisco Impingement Test Left] : positive Lofton test [Shoulder Pain Elicited During Neer Impingement Test Left] : positive Neer test [Shoulder Pain Elicited During [place]'s Test Left] : negative Mcgowan's test [Active Abduction Left Shoulder Decreased] : negative Painful Arc [Shoulder Instability Anterior Relocation Test Left] : negative Relocation test [Tender On Palpation Bicipital Groove Speed's Test Left] : negative Speed's test [Normal RUE] : Right Upper Extremity: No scars, rashes, lesions, ulcers, skin intact [Normal LUE] : Left Upper Extremity: No scars, rashes, lesions, ulcers, skin intact [Normal Touch] : sensation intact for touch [Obese] : obese [Normal] : Oriented to person, place, and time, insight and judgement were intact and the affect was normal [FreeTextEntry2] : Shoulders\par Elevation actively to 170 degrees with mild pain on the left.  Internal rotation to L4 on the left versus T10 on the right with pain on the left.\par External rotation to about 50 degrees on the left versus 60 degrees on the right.\par Positive Neer and Lofton left shoulder [de-identified] : No respiratory distress or cough [de-identified] : \par X-rays of the left shoulder AP, bilateral and axillary views taken 4/22/21 showed small calcific density in the supraspinatus tendon just off the greater tuberosity.  Spurring and narrowing of the AC joint consistent with arthritis.

## 2021-06-01 NOTE — REASON FOR VISIT
[>50% of Time Spent on Counseling and Coordination of Care for  ___] : Greater than 50% of the encounter time was spent on counseling and coordination of care for [unfilled] [Follow-Up Visit] : a follow-up visit for [Knee Pain] : knee pain

## 2021-06-02 ENCOUNTER — APPOINTMENT (OUTPATIENT)
Dept: ORTHOPEDIC SURGERY | Facility: CLINIC | Age: 61
End: 2021-06-02

## 2021-06-09 ENCOUNTER — RESULT REVIEW (OUTPATIENT)
Age: 61
End: 2021-06-09

## 2021-06-10 ENCOUNTER — APPOINTMENT (OUTPATIENT)
Age: 61
End: 2021-06-10
Payer: COMMERCIAL

## 2021-06-10 PROCEDURE — 45380 COLONOSCOPY AND BIOPSY: CPT | Mod: 33

## 2021-06-22 RX ORDER — VALSARTAN 40 MG/1
40 TABLET, COATED ORAL
Qty: 90 | Refills: 3 | Status: ACTIVE | COMMUNITY
Start: 2021-04-22 | End: 1900-01-01

## 2021-06-22 RX ORDER — AMLODIPINE BESYLATE 5 MG/1
5 TABLET ORAL DAILY
Qty: 90 | Refills: 3 | Status: ACTIVE | COMMUNITY
Start: 2019-10-04 | End: 1900-01-01

## 2021-06-23 ENCOUNTER — APPOINTMENT (OUTPATIENT)
Dept: NEPHROLOGY | Facility: CLINIC | Age: 61
End: 2021-06-23

## 2021-06-24 ENCOUNTER — APPOINTMENT (OUTPATIENT)
Dept: NEPHROLOGY | Facility: CLINIC | Age: 61
End: 2021-06-24

## 2021-07-06 ENCOUNTER — RX RENEWAL (OUTPATIENT)
Age: 61
End: 2021-07-06

## 2021-07-06 RX ORDER — GLIMEPIRIDE 1 MG/1
1 TABLET ORAL DAILY
Qty: 90 | Refills: 0 | Status: ACTIVE | COMMUNITY
Start: 2019-08-14 | End: 1900-01-01

## 2025-04-17 NOTE — HISTORY OF PRESENT ILLNESS
The patient is following up from recent hospitalization for PUD.  The patient had a bleeding ulcer.  Bleeding has resolved now and he is eating back to normal now.  MRI abdomen did not show pancreatic cancer.  09/13/24 Currently doing ok.  Still complaining of indigestion complaining of some abdominal pain with hunger and eating cold food and strange foods.  4/17/25 Currently doing well.  dicyclomine has helped with the abdominal cramps.  No complaints today. [FreeTextEntry1] : Kindly referred by Dr. Syed for CKD.\par \par * The previous records were reviewed and summarized. Cr has slowly  increased from 1.21 in June 2018 to 1.57 in Oct 2019. 87 - 293 mg albuminuria. No hematuria. SPEP, HIV normal. In August 2019, lisinopril 10 was changed to lisinopril 20/12.5 mg. The patient denies exposure to chronic NSAIDs, chronic PPIs, green smoothies, creatine, or herbal supplements. The patient denies a history of kidney stones or UTIs.  No significant nocturia. No recent renal ultrasound. They are unaware of proteinuria or hematuria. \par \par * DM uncontrolled, HGA1c 7.2. Disgnosed 5 years ago. No neuropathy. No retinopathy. \par \par * He checks his BP at home but doesn't feel his cuff is big enough. He doesn't rest.  - 150s.